# Patient Record
Sex: FEMALE | Race: WHITE | URBAN - METROPOLITAN AREA
[De-identification: names, ages, dates, MRNs, and addresses within clinical notes are randomized per-mention and may not be internally consistent; named-entity substitution may affect disease eponyms.]

---

## 2020-05-29 ENCOUNTER — TELEPHONE (OUTPATIENT)
Dept: BARIATRICS/WEIGHT MGMT | Age: 50
End: 2020-05-29

## 2020-07-20 ENCOUNTER — TELEPHONE (OUTPATIENT)
Dept: BARIATRICS/WEIGHT MGMT | Age: 50
End: 2020-07-20

## 2020-07-20 NOTE — TELEPHONE ENCOUNTER
Called as a new pt courtesy call - spoke w patient. Did receive paperwork - told patient to have new pt paperwork completely filled out, insurance card, and id and to arrive at appt time. If they didn't have the paperwork filled out and arrive on time may be rescheduled. PT WAS EXPLAINED COVID19 RESTRICTIONS, pt fought with  me when I explained she could not bring anyone else with her to this visit. Pt was rude and did not accept that she needed to come solo in order to minimize traffic in the office. Would not let it go that she needed to come alone. Pt wants to talk to PM because she wanted to bring her sister who is a nurse to this visit with her.  Phone number is [phone]

## 2020-07-23 ENCOUNTER — TELEPHONE (OUTPATIENT)
Dept: BARIATRICS/WEIGHT MGMT | Age: 50
End: 2020-07-23

## 2020-07-23 ENCOUNTER — OFFICE VISIT (OUTPATIENT)
Dept: BARIATRICS/WEIGHT MGMT | Age: 50
End: 2020-07-23
Payer: COMMERCIAL

## 2020-07-23 VITALS
BODY MASS INDEX: 24.65 KG/M2 | RESPIRATION RATE: 18 BRPM | DIASTOLIC BLOOD PRESSURE: 76 MMHG | HEART RATE: 91 BPM | OXYGEN SATURATION: 99 % | SYSTOLIC BLOOD PRESSURE: 128 MMHG | WEIGHT: 144.4 LBS | HEIGHT: 64 IN

## 2020-07-23 PROBLEM — Z98.84 S/P LAPAROSCOPIC SLEEVE GASTRECTOMY: Status: ACTIVE | Noted: 2020-07-23

## 2020-07-23 PROCEDURE — 99204 OFFICE O/P NEW MOD 45 MIN: CPT | Performed by: SURGERY

## 2020-07-23 RX ORDER — PANTOPRAZOLE SODIUM 40 MG/1
40 TABLET, DELAYED RELEASE ORAL DAILY
COMMUNITY

## 2020-07-23 RX ORDER — SUCRALFATE 1 G/1
1 TABLET ORAL 4 TIMES DAILY
COMMUNITY

## 2020-07-23 RX ORDER — DEXTROAMPHETAMINE SACCHARATE, AMPHETAMINE ASPARTATE, DEXTROAMPHETAMINE SULFATE AND AMPHETAMINE SULFATE 7.5; 7.5; 7.5; 7.5 MG/1; MG/1; MG/1; MG/1
30 TABLET ORAL DAILY
COMMUNITY

## 2020-07-23 NOTE — PROGRESS NOTES
Aaliyah Rider is a 51 y.o. female with a date of birth of 1970. Vitals:    07/23/20 1116   BP: 128/76   Pulse: 91   Resp: 18   SpO2: 99%    BMI: Body mass index is 24.79 kg/m². Weight History: Wt Readings from Last 3 Encounters:   07/23/20 144 lb 6.4 oz (65.5 kg)       Pt with hx stomach ulcer, GERD, cholecystectomy 2005, gastric sleeve 2019. Patient is not lactose intolerant. Patient does not have Baptist/cultural food concerns. Patient does not have food allergies. 24 hour recall/food frequency chart: Pt reports eating 2 yogurts daily. Vomitting daily. Reports some improvement with N/V after 7/14/20 EGD + dilation but worsening x 2 days. Pt has tried chix noodle soup, applesauce, cottage cheese, scrabbled egg and will vomit. Has tried [employer], can tolerate 2-3 sips but N/V hours after. Breakfast: no.   Lunch: yogurt  Dinner: yogurt  Drinks throughout the day: regular coffee with creamer - 10-12c daily, dislikes water    Protein 60-80g/day - No    Fluids 48-64oz/day - No    Supplementation: Bariatric vitamin - [facility] - contains Ca -  inconsistent with taking,  Skips if feeling like cannot tolerate    Following 30/30/30 - yes     Assessment  Nutritional Needs: RMR=(9.99 x 65.6) + (6.25 x 162.6) - (4.92 x 49 y.o.) -161  = 1274 kcal x 1.4 (sedentary activity factor)= 1784 kcal    Plan  - F/U per provider    Goals:   - Fluids 48-64oz daily  - Protein 60-80g daily  - Protein water sample provided, encouraged if tolerate    PES Statement: Altered GI function r/t s/p cholecystectomy, s/p sleeve gastrectomy, hx of gastric ulcer AEB food intoleraces, daily N/V, wt loss. Will follow up as necessary.     [address]

## 2020-07-23 NOTE — PROGRESS NOTES
mouth daily, Disp: , Rfl:     sucralfate (CARAFATE) 1 GM tablet, Take 1 g by mouth 4 times daily, Disp: , Rfl:     amphetamine-dextroamphetamine (ADDERALL) 30 MG tablet, Take 30 mg by mouth daily. , Disp: , Rfl:       Review of Systems - History obtained from the patient  General ROS: feels tired   Psychological ROS: negative  Ophthalmic ROS: negative  Neurological ROS: negative  ENT ROS: negative  Allergy and Immunology ROS: negative  Hematological and Lymphatic ROS: negative  Endocrine ROS: overweight  Breast ROS: negative  Respiratory ROS: negative  Cardiovascular ROS: negative  Gastrointestinal ROS:prior bariatric surgery chronic nausea and vomiting and heartburn and dysphagia  Genito-Urinary ROS: negative  Musculoskeletal ROS: negative   Skin ROS: negative    Physical Exam   Constitutional: Patient is oriented to person, place, and time. Vital signs are normal. Patient  appears well-developed and well-nourished. Patient  is active and cooperative. Non-toxic appearance. No distress. HENT:   Head: Normocephalic and atraumatic. Head is without laceration. Right Ear: External ear normal. No lacerations. No drainage, swelling or tenderness. Left Ear: External ear normal. No lacerations. No drainage, swelling or tenderness. Nose: Nose normal. No nose lacerations or nasal deformity. Mouth/Throat: Uvula is midline, oropharynx is clear and moist and mucous membranes are normal. No oropharyngeal exudate. Eyes: Conjunctivae, EOM and lids are normal. Pupils are equal, round, and reactive to light. Right eye exhibits no discharge. No foreign body present in the right eye. Left eye exhibits no discharge. No foreign body present in the left eye. No scleral icterus. Neck: Trachea normal and normal range of motion. Neck supple. No JVD present. No tracheal tenderness present. Carotid bruit is not present. No rigidity. No tracheal deviation and no edema present. No thyromegaly present.    Cardiovascular: Normal rate, regular rhythm, normal heart sounds, intact distal pulses and normal pulses. Pulmonary/Chest: Effort normal and breath sounds normal. No stridor. No respiratory distress. Patient  has no wheezes. Patient has no rales. Patient exhibits no tenderness and no crepitus. Abdominal: Soft. Normal appearance and bowel sounds are normal. Patient exhibits no distension, no abdominal bruit, no ascites and no mass. There is no hepatosplenomegaly. There is no tenderness. There is no rigidity, no rebound, no guarding and no CVA tenderness. No hernia. Hernia confirmed negative in the ventral area. Musculoskeletal: Normal range of motion. Patient exhibits no edema or tenderness. Lymphadenopathy:        Head (right side): No submental, no submandibular, no preauricular, no posterior auricular and no occipital adenopathy present. Head (left side): No submental, no submandibular, no preauricular, no posterior auricular and no occipital adenopathy present. Patient  has no cervical adenopathy. Right: No supraclavicular adenopathy present. Left: No supraclavicular adenopathy present. Neurological: Patient is alert and oriented to person, place, and time. Patient has normal strength. Coordination and gait normal. GCS eye subscore is 4. GCS verbal subscore is 5. GCS motor subscore is 6. Skin: Skin is warm and dry. No abrasion and no rash noted. Patient  is not diaphoretic. No cyanosis or erythema. Psychiatric: Patient has a normal mood and affect. speech is normal and behavior is normal. Cognition and memory are normal.           [facility] was seen today for bariatrics post op follow up. Diagnoses and all orders for this visit:    Stenotic gastric pouch, complication of bariatric surgery  -     CBC Auto Differential; Future  -     Comprehensive Metabolic Panel; Future  -     Hemoglobin A1C; Future  -     Iron and TIBC; Future  -     Lipid Panel; Future  -     TSH with Reflex;  Future  - Vitamin A; Future  -     Vitamin B1, Whole Blood; Future  -     Vitamin B12 & Folate; Future  -     Vitamin D 25 Hydroxy; Future  -     Vitamin E; Future  -     Protime-INR; Future    Body mass index (BMI) 24.0-24.9, adult  -     CBC Auto Differential; Future  -     Comprehensive Metabolic Panel; Future  -     Hemoglobin A1C; Future  -     Iron and TIBC; Future  -     Lipid Panel; Future  -     TSH with Reflex; Future  -     Vitamin A; Future  -     Vitamin B1, Whole Blood; Future  -     Vitamin B12 & Folate; Future  -     Vitamin D 25 Hydroxy; Future  -     Vitamin E; Future  -     Protime-INR; Future    S/P laparoscopic sleeve gastrectomy  -     CBC Auto Differential; Future  -     Comprehensive Metabolic Panel; Future  -     Hemoglobin A1C; Future  -     Iron and TIBC; Future  -     Lipid Panel; Future  -     TSH with Reflex; Future  -     Vitamin A; Future  -     Vitamin B1, Whole Blood; Future  -     Vitamin B12 & Folate; Future  -     Vitamin D 25 Hydroxy; Future  -     Vitamin E; Future  -     Protime-INR; Future          Adarsh Watts is 51 y.o. female , now with Body mass index is 24.79 kg/m². s/p laparoscopic sleeve gastrectomy at outside facility , has lost , total of 59 Lbs weight loss. The patient underwent dietary counseling with registered dietician. I have reviewed, discussed and agree with the dietary plan. Patient is trying hard to keep good dietary and behavior modifications. We discussed how her weight affects her overall health including:  Patient Active Problem List   Diagnosis    Body mass index (BMI) 24.0-24.9, adult    S/P laparoscopic sleeve gastrectomy    and importance of weight loss to alleviate those co morbid conditions. I encouraged the patient to continue exercise and keeping healthy eating habits. Also counseled the patient extensively on post surgery care.      I spent a total of 45 minutes face to face with the patient and greater than 50% of the time was spent in counseling, answering questions, addressing concerns, reviewing labs and/or other studies, surgical report , radiology reports with the patient as well as coordinating care. Yeni Fields is a very pleasant 81-year-old female. 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 She had her surgery with Dr. Selma Schwab, currently following with Dr. Aruna Shrestha. She had balloon dilation x2 given her chronic nausea vomiting and dysphagia as well as heartburn. He has been on Protonix twice a day, Carafate and Reglan without any relief of her symptoms. Patient has not been able to maintain good dietary intake for the past several months. However she informs me that her weight has been stable between 140 and 145 pounds. Long discussion today about her condition and what of further treatment options. Explained to the patient that definitely Jaskaran-en-Y gastric bypass revisional surgery as proposed by her primary team is very reasonable option. She just needs to be aware that there is always a risk of complications or nonresolution of her symptoms. However patient informed me that she is not able to tolerate her symptoms and she wants to have revisional surgery. Advised the patient that she needs to get her nutrition in the better place so she can improve her chances of better recovery. Protein water samples provided to the patient's (2 bottles). Also will check nutrition labs on her. Also advised the patient that I need a copy of her DVD to fully evaluate her gastric sleeve. I also advised the patient that I do not perform revisional surgery. Patient will bring DVD with CT scan and upper GI  Nutrition labs  Increase protein intake (protein water sample provided to the patient)       Patient advised that its their responsibility to follow up for studies, referrals and/or labs ordered today.

## 2020-07-23 NOTE — TELEPHONE ENCOUNTER
Patient was seen today by Dr. Kevin Handy for NP appt. She was told to obtain the CD with images from CT Scan & UGI from [facility]. She was able to get the CD but it only contained the UGI images. She stated she spoke with that office and the images are in PACS and should be able to be reviewed through [facility]. She stated if Dr. Kevin Handy can not review then she will go back to the office for the other CD.    Thanks

## 2020-07-29 ENCOUNTER — OFFICE VISIT (OUTPATIENT)
Dept: PRIMARY CARE CLINIC | Age: 50
End: 2020-07-29
Payer: COMMERCIAL

## 2020-07-29 PROCEDURE — 99211 OFF/OP EST MAY X REQ PHY/QHP: CPT | Performed by: NURSE PRACTITIONER

## 2020-07-29 NOTE — PROGRESS NOTES
Giovanny CHICAS Nelly received a viral test for COVID-19. They were educated on isolation and quarantine as appropriate. For any symptoms, they were directed to seek care from their PCP, given contact information to establish with a doctor, directed to an urgent care or the emergency room.

## 2020-07-29 NOTE — PATIENT INSTRUCTIONS

## 2020-07-30 LAB
REPORT: NORMAL
SARS-COV-2: NOT DETECTED
THIS TEST SENT TO: NORMAL

## 2020-08-10 NOTE — TELEPHONE ENCOUNTER
LVM for patient to call office back with update. Was she able to obtain CD with images from CT scan & UGI.

## 2020-08-18 ENCOUNTER — TELEPHONE (OUTPATIENT)
Dept: BARIATRICS/WEIGHT MGMT | Age: 50
End: 2020-08-18

## 2020-08-18 PROBLEM — K95.89: Status: ACTIVE | Noted: 2020-08-18

## 2020-08-18 NOTE — TELEPHONE ENCOUNTER
Try to call the patient to discuss with her the findings after I reviewed her CT scan. Went to voicemail I left her a message to call us at her convenience.

## 2020-08-18 NOTE — TELEPHONE ENCOUNTER
I called the patient back and advised her that I agree with Brijesh Zimmerman her gastroenterologist at [facility] as far as his plan using pneumatic balloon dilation/achalasia balloon. And if that does not work then the next step is considering is stenting which I also agree with patient exhaust all those options then surgical revision could be explored.